# Patient Record
Sex: MALE | Race: WHITE | Employment: FULL TIME | URBAN - METROPOLITAN AREA
[De-identification: names, ages, dates, MRNs, and addresses within clinical notes are randomized per-mention and may not be internally consistent; named-entity substitution may affect disease eponyms.]

---

## 2017-11-10 ENCOUNTER — TELEPHONE (OUTPATIENT)
Dept: FAMILY MEDICINE CLINIC | Age: 57
End: 2017-11-10

## 2017-11-30 ENCOUNTER — HOSPITAL ENCOUNTER (OUTPATIENT)
Age: 57
Discharge: HOME | End: 2017-11-30
Payer: COMMERCIAL

## 2017-11-30 ENCOUNTER — HOSPITAL ENCOUNTER (OUTPATIENT)
Dept: GENERAL RADIOLOGY | Age: 57
Discharge: HOME | End: 2017-11-30
Payer: COMMERCIAL

## 2017-11-30 DIAGNOSIS — S83.261A PERIPHERAL TEAR OF LATERAL MENISCUS OF RIGHT KNEE, UNSPECIFIED WHETHER OLD OR CURRENT TEAR, INITIAL ENCOUNTER: ICD-10-CM

## 2017-11-30 DIAGNOSIS — S83.261A ACUTE LATERAL MENISCAL TEAR WITH PERIPHERAL DETACHMENT, RIGHT, INITIAL ENCOUNTER: ICD-10-CM

## 2017-11-30 LAB
ANION GAP SERPL CALCULATED.3IONS-SCNC: 10 MEQ/L (ref 8–16)
BASOPHILS # BLD: 0.7 %
BASOPHILS ABSOLUTE: 0.1 THOU/MM3 (ref 0–0.1)
BUN BLDV-MCNC: 16 MG/DL (ref 7–22)
CALCIUM SERPL-MCNC: 9.1 MG/DL (ref 8.5–10.5)
CHLORIDE BLD-SCNC: 101 MEQ/L (ref 98–111)
CO2: 27 MEQ/L (ref 23–33)
CREAT SERPL-MCNC: 1.1 MG/DL (ref 0.4–1.2)
EKG ATRIAL RATE: 67 BPM
EKG P AXIS: 81 DEGREES
EKG P-R INTERVAL: 134 MS
EKG Q-T INTERVAL: 400 MS
EKG QRS DURATION: 80 MS
EKG QTC CALCULATION (BAZETT): 422 MS
EKG R AXIS: 79 DEGREES
EKG T AXIS: 70 DEGREES
EKG VENTRICULAR RATE: 67 BPM
EOSINOPHIL # BLD: 3.5 %
EOSINOPHILS ABSOLUTE: 0.3 THOU/MM3 (ref 0–0.4)
GFR SERPL CREATININE-BSD FRML MDRD: 68 ML/MIN/1.73M2
GLUCOSE BLD-MCNC: 98 MG/DL (ref 70–108)
HCT VFR BLD CALC: 41.1 % (ref 42–52)
HEMOGLOBIN: 14 GM/DL (ref 14–18)
LYMPHOCYTES # BLD: 21.2 %
LYMPHOCYTES ABSOLUTE: 1.7 THOU/MM3 (ref 1–4.8)
MCH RBC QN AUTO: 31.9 PG (ref 27–31)
MCHC RBC AUTO-ENTMCNC: 34 GM/DL (ref 33–37)
MCV RBC AUTO: 93.6 FL (ref 80–94)
MONOCYTES # BLD: 8 %
MONOCYTES ABSOLUTE: 0.6 THOU/MM3 (ref 0.4–1.3)
NUCLEATED RED BLOOD CELLS: 0 /100 WBC
PDW BLD-RTO: 13.1 % (ref 11.5–14.5)
PLATELET # BLD: 284 THOU/MM3 (ref 130–400)
PMV BLD AUTO: 7.2 MCM (ref 7.4–10.4)
POTASSIUM SERPL-SCNC: 4.8 MEQ/L (ref 3.5–5.2)
RBC # BLD: 4.39 MILL/MM3 (ref 4.7–6.1)
SEG NEUTROPHILS: 66.6 %
SEGMENTED NEUTROPHILS ABSOLUTE COUNT: 5.3 THOU/MM3 (ref 1.8–7.7)
SODIUM BLD-SCNC: 138 MEQ/L (ref 135–145)
WBC # BLD: 8 THOU/MM3 (ref 4.8–10.8)

## 2017-11-30 PROCEDURE — 93005 ELECTROCARDIOGRAM TRACING: CPT

## 2017-11-30 PROCEDURE — 80048 BASIC METABOLIC PNL TOTAL CA: CPT

## 2017-11-30 PROCEDURE — 36415 COLL VENOUS BLD VENIPUNCTURE: CPT

## 2017-11-30 PROCEDURE — 85025 COMPLETE CBC W/AUTO DIFF WBC: CPT

## 2017-11-30 PROCEDURE — 71020 XR CHEST STANDARD TWO VW: CPT

## 2017-12-04 ENCOUNTER — OFFICE VISIT (OUTPATIENT)
Dept: FAMILY MEDICINE CLINIC | Age: 57
End: 2017-12-04

## 2017-12-04 VITALS
HEART RATE: 76 BPM | DIASTOLIC BLOOD PRESSURE: 64 MMHG | RESPIRATION RATE: 14 BRPM | WEIGHT: 134.13 LBS | HEIGHT: 70 IN | BODY MASS INDEX: 19.2 KG/M2 | SYSTOLIC BLOOD PRESSURE: 114 MMHG

## 2017-12-04 DIAGNOSIS — S83.281D TEAR OF LATERAL MENISCUS OF RIGHT KNEE, CURRENT, UNSPECIFIED TEAR TYPE, SUBSEQUENT ENCOUNTER: ICD-10-CM

## 2017-12-04 DIAGNOSIS — S83.241D TEAR OF MEDIAL MENISCUS OF RIGHT KNEE, CURRENT, UNSPECIFIED TEAR TYPE, SUBSEQUENT ENCOUNTER: ICD-10-CM

## 2017-12-04 DIAGNOSIS — Z13.220 SCREENING FOR HYPERLIPIDEMIA: ICD-10-CM

## 2017-12-04 DIAGNOSIS — J44.9 CHRONIC OBSTRUCTIVE PULMONARY DISEASE, UNSPECIFIED COPD TYPE (HCC): Primary | ICD-10-CM

## 2017-12-04 PROCEDURE — 99214 OFFICE O/P EST MOD 30 MIN: CPT | Performed by: FAMILY MEDICINE

## 2017-12-04 RX ORDER — ALBUTEROL SULFATE 90 UG/1
2 AEROSOL, METERED RESPIRATORY (INHALATION) EVERY 6 HOURS PRN
Qty: 1 INHALER | Refills: 2 | Status: SHIPPED | OUTPATIENT
Start: 2017-12-04 | End: 2019-01-18 | Stop reason: SDUPTHER

## 2017-12-04 ASSESSMENT — ENCOUNTER SYMPTOMS
NAUSEA: 0
VOMITING: 0
COUGH: 0
ABDOMINAL PAIN: 0
DIARRHEA: 0
EYES NEGATIVE: 1
CHEST TIGHTNESS: 0
BLOOD IN STOOL: 0
CONSTIPATION: 0
SHORTNESS OF BREATH: 0

## 2017-12-04 ASSESSMENT — PATIENT HEALTH QUESTIONNAIRE - PHQ9
1. LITTLE INTEREST OR PLEASURE IN DOING THINGS: 0
SUM OF ALL RESPONSES TO PHQ9 QUESTIONS 1 & 2: 0
2. FEELING DOWN, DEPRESSED OR HOPELESS: 0
SUM OF ALL RESPONSES TO PHQ QUESTIONS 1-9: 0

## 2017-12-04 NOTE — PROGRESS NOTES
Constitutional: He is oriented to person, place, and time. He appears well-developed and well-nourished. No distress. HENT:   Head: Normocephalic and atraumatic. Eyes: Conjunctivae and EOM are normal. Pupils are equal, round, and reactive to light. Right eye exhibits no discharge. Left eye exhibits no discharge. No scleral icterus. Neck: Normal range of motion. Neck supple. No JVD present. No thyromegaly present. Cardiovascular: Normal rate, regular rhythm and normal heart sounds. No murmur heard. Pulmonary/Chest: Effort normal and breath sounds normal. No respiratory distress. He has no wheezes. He has no rhonchi. He has no rales. Abdominal: Soft. Bowel sounds are normal. He exhibits no distension and no mass. There is no hepatosplenomegaly. There is no tenderness. There is no rebound and no guarding. Musculoskeletal: Normal range of motion. He exhibits no edema. Lymphadenopathy:     He has no cervical adenopathy. Neurological: He is alert and oriented to person, place, and time. Skin: Skin is warm and dry. No rash noted. He is not diaphoretic. Psychiatric: He has a normal mood and affect. His behavior is normal.   Nursing note and vitals reviewed. Assessment:      1. Chronic obstructive pulmonary disease, unspecified COPD type ([address]     2. Tear of lateral meniscus of right knee, current, unspecified tear type, subsequent encounter     3. Tear of medial meniscus of right knee, current, unspecified tear type, subsequent encounter     4.  Screening for hyperlipidemia  Lipid Panel       Plan:      Requested Prescriptions     Signed Prescriptions Disp Refills    albuterol sulfate  (90 Base) MCG/ACT inhaler 1 Inhaler 2     Sig: Inhale 2 puffs into the lungs every 6 hours as needed for Wheezing     Current Outpatient Prescriptions   Medication Sig Dispense Refill    albuterol sulfate  (90 Base) MCG/ACT inhaler Inhale 2 puffs into the lungs every 6 hours as needed for Wheezing 1 Inhaler 2    aspirin 325 MG tablet Take 325 mg by mouth every 6 hours as needed        No current facility-administered medications for this visit. Orders Placed This Encounter   Procedures    Lipid Panel     Laboratory Test to be done in 3 months     Standing Status:   Future     Standing Expiration Date:   12/4/2018     Order Specific Question:   Is Patient Fasting?/# of Hours     Answer:   12     Continue current medications. Discussed use, benefit, and side effects of prescribed medications. All patient questions answered. Pt voiced understanding. Instructed to continue current medications, diet and exercise. Patient agreed with treatment plan.      Results for orders placed or performed during the hospital encounter of 11/30/17   CBC Auto Differential   Result Value Ref Range    WBC 8.0 4.8 - 10.8 thou/mm3    RBC 4.39 (L) 4.70 - 6.10 mill/mm3    Hemoglobin 14.0 14.0 - 18.0 gm/dl    Hematocrit 41.1 (L) 42.0 - 52.0 %    MCV 93.6 80.0 - 94.0 fL    MCH 31.9 (H) 27.0 - 31.0 pg    MCHC 34.0 33.0 - 37.0 gm/dl    RDW 13.1 11.5 - 14.5 %    Platelets 731 394 - 037 thou/mm3    MPV 7.2 (L) 7.4 - 10.4 mcm    Seg Neutrophils 66.6 %    Lymphocytes 21.2 %    Monocytes 8.0 %    Eosinophils 3.5 %    Basophils 0.7 %    nRBC 0 /100 wbc    Segs Absolute 5.3 1.8 - 7.7 thou/mm3    Lymphocytes # 1.7 1.0 - 4.8 thou/mm3    Monocytes # 0.6 0.4 - 1.3 thou/mm3    Eosinophils # 0.3 0.0 - 0.4 thou/mm3    Basophils # 0.1 0.0 - 0.1 thou/mm3   Basic Metabolic Panel   Result Value Ref Range    Sodium 138 135 - 145 meq/L    Potassium 4.8 3.5 - 5.2 meq/L    Chloride 101 98 - 111 meq/L    CO2 27 23 - 33 meq/L    Glucose 98 70 - 108 mg/dL    BUN 16 7 - 22 mg/dL    CREATININE 1.1 0.4 - 1.2 mg/dL    Calcium 9.1 8.5 - 10.5 mg/dL   Glomerular Filtration Rate, Estimated   Result Value Ref Range    Est, Glom Filt Rate 68 (A) ml/min/1.73m2   Anion Gap   Result Value Ref Range    Anion Gap 10.0 8.0 - 16.0 meq/L   EKG 12 Lead   Result Value Ref

## 2017-12-05 RX ORDER — ACETAMINOPHEN 500 MG
500 TABLET ORAL EVERY 6 HOURS PRN
COMMUNITY

## 2017-12-08 ENCOUNTER — TELEPHONE (OUTPATIENT)
Dept: FAMILY MEDICINE CLINIC | Age: 57
End: 2017-12-08

## 2017-12-08 NOTE — TELEPHONE ENCOUNTER
Cassi Butcher from Dr Winnie Franco office called req we fax the Pre Op [address] [facility] [address] form to [facility] ASAP.

## 2017-12-08 NOTE — H&P
[address] [place] [address]                         PREOPERATIVE HISTORY AND PHYSICAL    PATIENT NAME: Ira Diego                    :        1960  MED REC NO:   [id number]                           ROOM:  ACCOUNT NO:   [de-identified]                           ADMIT DATE: 2017  PROVIDER:     Sherwood Rinne, P.a.c[facility] DATE OF SURGERY:  2017. CHIEF COMPLAINT:  Right knee pain. HISTORY OF PRESENT ILLNESS:  The patient was injured at work when he was  bit by a dog and had chronic pain, was seen by us, had an MRI that shows  the lateral meniscus tear as well as a medial meniscus tear. Due to the  findings on the MRI as well as the patient's activities of daily living  being limited by the right knee injury, Dr. Antonio Alexander has recommended  the right knee arthroscopy for diagnostic treatment, debridement and  partial lateral meniscectomy as well as possible partial medial  meniscectomy. The patient has been educated and the risks and benefits of  the procedure including infection, stiffness of knee, retearing of the  remaining meniscus as well as development of arthritis earlier in life in  the right knee. Patient has been educated on pre and postoperative  protocols understanding the risks, benefits as well as pre and  postoperative protocols. The patient has agreed to forward with the  above-stated procedure on 2017. PAST MEDICAL HISTORY:  COPD and skin cancer. PAST SURGICAL HISTORY:  Extraction of wisdom teeth and skin cancer excision  on the left side of his face. FAMILY HISTORY:  Otherwise noncontributory. SOCIAL HISTORY:  The patient works for [employer], has a long smoking history of  one to two packs per day for up to 30 to 40 years and now smokes three  cigarettes per day for the last 1 to 2 years. Denies use of alcohol or  street drugs.     CURRENT MEDICATIONS:  Albuterol inhaler as needed. ALLERGIES:  No known drug allergies. REVIEW OF SYSTEMS:  Otherwise noncontributory. PHYSICAL EXAMINATION:  GENERAL:  A well-developed, well-nourished 47-year-old white male who is  alert and oriented x3, appropriate mood and affect, well dressed and  groomed. HEENT:  Normocephalic, atraumatic. EOMs intact. No pharyngitis. No  obvious infectious teeth. LUNGS:  Clear to auscultation bilaterally. CARDIOVASCULAR:  Regular rate and rhythm. No murmurs, rubs or gallops. ABDOMEN:  Soft, nontender, positive bowel sounds. MUSCULOSKELETAL:  Exam of the right lower extremity today shows range of  motion 0 to 130 degrees. He has a well-healed puncture wound from the dog  bite on the medial aspect of the knee, proximal tibia area. A mild amount  of swelling. Negative valgus varus and Lachman's test.  Positive Albrecht's  test.  Tenderness to palpation over the medial and lateral joint lines. MRI shows lateral meniscus tear as well as medial meniscus tear. IMPRESSION:  Right knee medial and lateral meniscus tears. PLAN:  Right knee arthroscopy for diagnostic treatment, debridement and  partial lateral meniscectomy and possible medial meniscectomy. ISHA Schmidt     D: 12/08/2017 9:02:19       T: 12/08/2017 9:05:26     [email or web address]  Job#: [id number]     Doc#: [id number]    CC:

## 2017-12-11 ENCOUNTER — ANESTHESIA EVENT (OUTPATIENT)
Dept: OPERATING ROOM | Age: 57
End: 2017-12-11
Payer: COMMERCIAL

## 2017-12-11 ENCOUNTER — ANESTHESIA (OUTPATIENT)
Dept: OPERATING ROOM | Age: 57
End: 2017-12-11
Payer: COMMERCIAL

## 2017-12-11 ENCOUNTER — HOSPITAL ENCOUNTER (OUTPATIENT)
Age: 57
Setting detail: OUTPATIENT SURGERY
Discharge: HOME | End: 2017-12-11
Attending: ORTHOPAEDIC SURGERY | Admitting: ORTHOPAEDIC SURGERY
Payer: COMMERCIAL

## 2017-12-11 VITALS
TEMPERATURE: 98 F | DIASTOLIC BLOOD PRESSURE: 65 MMHG | HEIGHT: 70 IN | WEIGHT: 135 LBS | HEART RATE: 66 BPM | SYSTOLIC BLOOD PRESSURE: 111 MMHG | BODY MASS INDEX: 19.33 KG/M2 | OXYGEN SATURATION: 98 % | RESPIRATION RATE: 16 BRPM

## 2017-12-11 VITALS — SYSTOLIC BLOOD PRESSURE: 98 MMHG | OXYGEN SATURATION: 86 % | TEMPERATURE: 96.8 F | DIASTOLIC BLOOD PRESSURE: 66 MMHG

## 2017-12-11 PROBLEM — M23.303 DERANGEMENT OF MEDIAL MENISCUS OF RIGHT KNEE: Status: ACTIVE | Noted: 2017-12-11

## 2017-12-11 PROBLEM — M23.300 DERANGEMENT OF LATERAL MENISCUS OF RIGHT KNEE: Status: ACTIVE | Noted: 2017-12-11

## 2017-12-11 PROCEDURE — A6222 GAUZE <=16 IN NO W/SAL W/O B: HCPCS | Performed by: ORTHOPAEDIC SURGERY

## 2017-12-11 PROCEDURE — 2580000003 HC RX 258: Performed by: ORTHOPAEDIC SURGERY

## 2017-12-11 PROCEDURE — 7100000001 HC PACU RECOVERY - ADDTL 15 MIN: Performed by: ORTHOPAEDIC SURGERY

## 2017-12-11 PROCEDURE — A6258 TRANSPARENT FILM >16<=48 IN: HCPCS | Performed by: ORTHOPAEDIC SURGERY

## 2017-12-11 PROCEDURE — 7100000000 HC PACU RECOVERY - FIRST 15 MIN: Performed by: ORTHOPAEDIC SURGERY

## 2017-12-11 PROCEDURE — 3600000004 HC SURGERY LEVEL 4 BASE: Performed by: ORTHOPAEDIC SURGERY

## 2017-12-11 PROCEDURE — A6402 STERILE GAUZE <= 16 SQ IN: HCPCS | Performed by: ORTHOPAEDIC SURGERY

## 2017-12-11 PROCEDURE — 2500000003 HC RX 250 WO HCPCS: Performed by: NURSE ANESTHETIST, CERTIFIED REGISTERED

## 2017-12-11 PROCEDURE — 6360000002 HC RX W HCPCS: Performed by: REGISTERED NURSE

## 2017-12-11 PROCEDURE — 3700000001 HC ADD 15 MINUTES (ANESTHESIA): Performed by: ORTHOPAEDIC SURGERY

## 2017-12-11 PROCEDURE — 6360000002 HC RX W HCPCS: Performed by: ORTHOPAEDIC SURGERY

## 2017-12-11 PROCEDURE — 3600000014 HC SURGERY LEVEL 4 ADDTL 15MIN: Performed by: ORTHOPAEDIC SURGERY

## 2017-12-11 PROCEDURE — 6360000002 HC RX W HCPCS: Performed by: NURSE ANESTHETIST, CERTIFIED REGISTERED

## 2017-12-11 PROCEDURE — 7100000010 HC PHASE II RECOVERY - FIRST 15 MIN: Performed by: ORTHOPAEDIC SURGERY

## 2017-12-11 PROCEDURE — 7100000011 HC PHASE II RECOVERY - ADDTL 15 MIN: Performed by: ORTHOPAEDIC SURGERY

## 2017-12-11 PROCEDURE — 2500000003 HC RX 250 WO HCPCS: Performed by: ORTHOPAEDIC SURGERY

## 2017-12-11 PROCEDURE — A4450 NON-WATERPROOF TAPE: HCPCS | Performed by: ORTHOPAEDIC SURGERY

## 2017-12-11 PROCEDURE — A6454 SELF-ADHER BAND W>=3" <5"/YD: HCPCS | Performed by: ORTHOPAEDIC SURGERY

## 2017-12-11 PROCEDURE — A6252 ABSORPT DRG >16 <=48 W/O BDR: HCPCS | Performed by: ORTHOPAEDIC SURGERY

## 2017-12-11 PROCEDURE — 3700000000 HC ANESTHESIA ATTENDED CARE: Performed by: ORTHOPAEDIC SURGERY

## 2017-12-11 RX ORDER — METHYLPREDNISOLONE ACETATE 80 MG/ML
INJECTION, SUSPENSION INTRA-ARTICULAR; INTRALESIONAL; INTRAMUSCULAR; SOFT TISSUE PRN
Status: DISCONTINUED | OUTPATIENT
Start: 2017-12-11 | End: 2017-12-11 | Stop reason: HOSPADM

## 2017-12-11 RX ORDER — DEXAMETHASONE SODIUM PHOSPHATE 4 MG/ML
INJECTION, SOLUTION INTRA-ARTICULAR; INTRALESIONAL; INTRAMUSCULAR; INTRAVENOUS; SOFT TISSUE PRN
Status: DISCONTINUED | OUTPATIENT
Start: 2017-12-11 | End: 2017-12-11 | Stop reason: SDUPTHER

## 2017-12-11 RX ORDER — ONDANSETRON 2 MG/ML
INJECTION INTRAMUSCULAR; INTRAVENOUS PRN
Status: DISCONTINUED | OUTPATIENT
Start: 2017-12-11 | End: 2017-12-11 | Stop reason: SDUPTHER

## 2017-12-11 RX ORDER — LABETALOL HYDROCHLORIDE 5 MG/ML
10 INJECTION, SOLUTION INTRAVENOUS EVERY 10 MIN PRN
Status: DISCONTINUED | OUTPATIENT
Start: 2017-12-11 | End: 2017-12-11 | Stop reason: HOSPADM

## 2017-12-11 RX ORDER — ACETAMINOPHEN 325 MG/1
650 TABLET ORAL EVERY 4 HOURS PRN
Status: DISCONTINUED | OUTPATIENT
Start: 2017-12-11 | End: 2017-12-11 | Stop reason: HOSPADM

## 2017-12-11 RX ORDER — KETOROLAC TROMETHAMINE 30 MG/ML
INJECTION, SOLUTION INTRAMUSCULAR; INTRAVENOUS PRN
Status: DISCONTINUED | OUTPATIENT
Start: 2017-12-11 | End: 2017-12-11 | Stop reason: SDUPTHER

## 2017-12-11 RX ORDER — ONDANSETRON 2 MG/ML
4 INJECTION INTRAMUSCULAR; INTRAVENOUS EVERY 6 HOURS PRN
Status: DISCONTINUED | OUTPATIENT
Start: 2017-12-11 | End: 2017-12-11 | Stop reason: HOSPADM

## 2017-12-11 RX ORDER — BUPIVACAINE HYDROCHLORIDE 5 MG/ML
INJECTION, SOLUTION EPIDURAL; INTRACAUDAL PRN
Status: DISCONTINUED | OUTPATIENT
Start: 2017-12-11 | End: 2017-12-11 | Stop reason: HOSPADM

## 2017-12-11 RX ORDER — LIDOCAINE HYDROCHLORIDE 20 MG/ML
INJECTION, SOLUTION INFILTRATION; PERINEURAL PRN
Status: DISCONTINUED | OUTPATIENT
Start: 2017-12-11 | End: 2017-12-11 | Stop reason: SDUPTHER

## 2017-12-11 RX ORDER — TRAMADOL HYDROCHLORIDE 50 MG/1
50 TABLET ORAL EVERY 6 HOURS PRN
Status: DISCONTINUED | OUTPATIENT
Start: 2017-12-11 | End: 2017-12-11 | Stop reason: HOSPADM

## 2017-12-11 RX ORDER — GLYCOPYRROLATE 0.2 MG/ML
INJECTION INTRAMUSCULAR; INTRAVENOUS PRN
Status: DISCONTINUED | OUTPATIENT
Start: 2017-12-11 | End: 2017-12-11 | Stop reason: SDUPTHER

## 2017-12-11 RX ORDER — SODIUM CHLORIDE 9 MG/ML
INJECTION, SOLUTION INTRAVENOUS CONTINUOUS
Status: DISCONTINUED | OUTPATIENT
Start: 2017-12-11 | End: 2017-12-11 | Stop reason: HOSPADM

## 2017-12-11 RX ORDER — DOCUSATE SODIUM 100 MG/1
100 CAPSULE, LIQUID FILLED ORAL 2 TIMES DAILY
Status: DISCONTINUED | OUTPATIENT
Start: 2017-12-11 | End: 2017-12-11 | Stop reason: HOSPADM

## 2017-12-11 RX ORDER — FENTANYL CITRATE 50 UG/ML
INJECTION, SOLUTION INTRAMUSCULAR; INTRAVENOUS PRN
Status: DISCONTINUED | OUTPATIENT
Start: 2017-12-11 | End: 2017-12-11 | Stop reason: SDUPTHER

## 2017-12-11 RX ORDER — PROMETHAZINE HYDROCHLORIDE 25 MG/ML
12.5 INJECTION, SOLUTION INTRAMUSCULAR; INTRAVENOUS
Status: DISCONTINUED | OUTPATIENT
Start: 2017-12-11 | End: 2017-12-11 | Stop reason: HOSPADM

## 2017-12-11 RX ORDER — PROPOFOL 10 MG/ML
INJECTION, EMULSION INTRAVENOUS PRN
Status: DISCONTINUED | OUTPATIENT
Start: 2017-12-11 | End: 2017-12-11 | Stop reason: SDUPTHER

## 2017-12-11 RX ADMIN — PROPOFOL 200 MG: 10 INJECTION, EMULSION INTRAVENOUS at 15:44

## 2017-12-11 RX ADMIN — WATER 1 G: 1 INJECTION INTRAMUSCULAR; INTRAVENOUS; SUBCUTANEOUS at 15:48

## 2017-12-11 RX ADMIN — FENTANYL CITRATE 50 MCG: 50 INJECTION INTRAMUSCULAR; INTRAVENOUS at 16:00

## 2017-12-11 RX ADMIN — ONDANSETRON 4 MG: 2 INJECTION INTRAMUSCULAR; INTRAVENOUS at 16:02

## 2017-12-11 RX ADMIN — LIDOCAINE HYDROCHLORIDE 100 MG: 20 INJECTION, SOLUTION EPIDURAL; INFILTRATION; INTRACAUDAL; PERINEURAL at 15:44

## 2017-12-11 RX ADMIN — SODIUM CHLORIDE: 9 INJECTION, SOLUTION INTRAVENOUS at 13:20

## 2017-12-11 RX ADMIN — KETOROLAC TROMETHAMINE 30 MG: 30 INJECTION, SOLUTION INTRAMUSCULAR at 16:09

## 2017-12-11 RX ADMIN — PHENYLEPHRINE HYDROCHLORIDE 100 MCG: 10 INJECTION INTRAMUSCULAR; INTRAVENOUS; SUBCUTANEOUS at 15:50

## 2017-12-11 RX ADMIN — PHENYLEPHRINE HYDROCHLORIDE 100 MCG: 10 INJECTION INTRAMUSCULAR; INTRAVENOUS; SUBCUTANEOUS at 16:10

## 2017-12-11 RX ADMIN — FENTANYL CITRATE 50 MCG: 50 INJECTION INTRAMUSCULAR; INTRAVENOUS at 16:09

## 2017-12-11 RX ADMIN — DEXAMETHASONE SODIUM PHOSPHATE 8 MG: 4 INJECTION, SOLUTION INTRAMUSCULAR; INTRAVENOUS at 16:02

## 2017-12-11 RX ADMIN — FENTANYL CITRATE 50 MCG: 50 INJECTION INTRAMUSCULAR; INTRAVENOUS at 15:44

## 2017-12-11 RX ADMIN — GLYCOPYRROLATE 0.2 MG: 0.2 INJECTION, SOLUTION INTRAMUSCULAR; INTRAVENOUS at 15:48

## 2017-12-11 ASSESSMENT — PULMONARY FUNCTION TESTS
PIF_VALUE: 4
PIF_VALUE: 12
PIF_VALUE: 3
PIF_VALUE: 13
PIF_VALUE: 4
PIF_VALUE: 3
PIF_VALUE: 3
PIF_VALUE: 0
PIF_VALUE: 13
PIF_VALUE: 16
PIF_VALUE: 4
PIF_VALUE: 13
PIF_VALUE: 14
PIF_VALUE: 12
PIF_VALUE: 15
PIF_VALUE: 13
PIF_VALUE: 4
PIF_VALUE: 4
PIF_VALUE: 15
PIF_VALUE: 16
PIF_VALUE: 23
PIF_VALUE: 3
PIF_VALUE: 3
PIF_VALUE: 15
PIF_VALUE: 13
PIF_VALUE: 1
PIF_VALUE: 16
PIF_VALUE: 15
PIF_VALUE: 15
PIF_VALUE: 2
PIF_VALUE: 3
PIF_VALUE: 16
PIF_VALUE: 13
PIF_VALUE: 13
PIF_VALUE: 15
PIF_VALUE: 12
PIF_VALUE: 12
PIF_VALUE: 13
PIF_VALUE: 16
PIF_VALUE: 2
PIF_VALUE: 3
PIF_VALUE: 15
PIF_VALUE: 11
PIF_VALUE: 3
PIF_VALUE: 5
PIF_VALUE: 4
PIF_VALUE: 15
PIF_VALUE: 4
PIF_VALUE: 1
PIF_VALUE: 15
PIF_VALUE: 4
PIF_VALUE: 3
PIF_VALUE: 13

## 2017-12-11 ASSESSMENT — PAIN SCALES - GENERAL
PAINLEVEL_OUTOF10: 2
PAINLEVEL_OUTOF10: 0

## 2017-12-11 ASSESSMENT — PAIN - FUNCTIONAL ASSESSMENT: PAIN_FUNCTIONAL_ASSESSMENT: 0-10

## 2017-12-11 ASSESSMENT — LIFESTYLE VARIABLES: SMOKING_STATUS: 1

## 2017-12-11 NOTE — PRE-PROCEDURE INSTRUCTIONS
[facility]  History and Physical Update    Pt Name: Vianey Morrison  MRN: [id number]  YOB: 1960  Date of evaluation: 12/11/2017    I have examined the patient and reviewed the H&P/Consult and there are no changes to the patient or plans.       Misbah Duarte  Electronically signed 12/11/2017 at 2:57 PM

## 2017-12-11 NOTE — PROGRESS NOTES
Pt resting on cart. States pain tolerable. Given snack and drink. FAmily at bedside.
Pt resting on cart. VSS. Pain tolerable wishes to go home.
Joselin [address]   ----------------------------------------------------------------------------------------------------------------

## 2017-12-11 NOTE — BRIEF OP NOTE
BRIEF OP NOTE    Pre Op Diagnosis:   r knee med/lat meniscal tear  Chondromalacia m/pf  Ligamentum mucosal tear    Post Op Diagnosis:  same    Operation:  r knee scope m/l menisectomy   Chondroplasty m/pf  Inject marcaine/depomedrol    Surgeon/Assistant:  teressa cutler/naun pac    Anesthesia/Assistant:  rosmery cutler/dedrick stockton    Type of Anesthesia:  General/tourniquet 27 mins    Fluids:  800 ml    Blood Loss:  0    Findings:  As above    Complications:  0    Surgical Specimen:  0      Emmett Modi MD  4:31 PM  12/11/2017

## 2017-12-11 NOTE — ANESTHESIA PRE PROCEDURE
Department of Anesthesiology  Preprocedure Note       Name:  Seb Stone   Age:  59 y.o.  :  1960                                          MRN:  [id number]         Date:  2017      Surgeon: Leotis Paget):  Ramiro Josue MD    Procedure: Procedure(s):  RIGHT KNEE ARTHROSCOPY PARTIAL LATERAL MEDIAL MENISECTOMY    Medications prior to admission:   Prior to Admission medications    Medication Sig Start Date End Date Taking? Authorizing Provider   acetaminophen (TYLENOL) 500 MG tablet Take 500 mg by mouth every 6 hours as needed for Pain   Yes Historical Provider, MD   albuterol sulfate  (90 Base) MCG/ACT inhaler Inhale 2 puffs into the lungs every 6 hours as needed for Wheezing 17  Yes Marilou Coy MD       Current medications:    Current Facility-Administered Medications   Medication Dose Route Frequency Provider Last Rate Last Dose    0.9 % sodium chloride infusion   Intravenous Continuous Ramiro Josue  mL/hr at 17 1320      ceFAZolin (ANCEF) 2 g in sterile water 20 mL IV syringe  2 g Intravenous Once Ramiro Josue MD           Allergies:  No Known Allergies    Problem List:  There is no problem list on file for this patient.       Past Medical History:        Diagnosis Date    Cancer [facility]) 2017    SKIN CANCER, LEFT FACE NEAR EAR    COPD (chronic obstructive [facility] disease) [facility]        Past Surgical History:        Procedure Laterality Date    VARICOCELECTOMY         Social History:    Social History   Substance Use Topics    Smoking status: Current Every Day Smoker     Packs/day: 0.25     Years: 40.00     Types: Cigarettes     Start date: 1985    Smokeless tobacco: Never Used    Alcohol use Yes      Comment: rarely                                Ready to quit: Not Answered  Counseling given: Not Answered      Vital Signs (Current):   Vitals:    17 1405 17 1312   BP:  114/66   Pulse:  76   Resp:  17   Temp:  98.2 °F (36.8 °C) TempSrc:  Temporal   SpO2:  95%   Weight: 135 lb (61.2 kg)    Height: 5' 10\" (1.778 m)                                               BP Readings from Last 3 Encounters:   12/11/17 114/66   12/04/17 114/64   06/05/17 132/80       NPO Status: Time of last liquid consumption: 2100                        Time of last solid consumption: 2100                        Date of last liquid consumption: 12/10/17                        Date of last solid food consumption: 12/10/17    BMI:   Wt Readings from Last 3 Encounters:   12/05/17 135 lb (61.2 kg)   12/04/17 134 lb 2 oz (60.8 kg)   06/05/17 130 lb (59 kg)     Body mass index is 19.37 kg/m². CBC:   Lab Results   Component Value Date    WBC 8.0 11/30/2017    RBC 4.39 11/30/2017    HGB 14.0 11/30/2017    HCT 41.1 11/30/2017    MCV 93.6 11/30/2017    RDW 13.1 11/30/2017     11/30/2017       CMP:   Lab Results   Component Value Date     11/30/2017    K 4.8 11/30/2017     11/30/2017    CO2 27 11/30/2017    BUN 16 11/30/2017    CREATININE 1.1 11/30/2017    LABGLOM 68 11/30/2017    GLUCOSE 98 11/30/2017    CALCIUM 9.1 11/30/2017       POC Tests: No results for input(s): POCGLU, POCNA, POCK, POCCL, POCBUN, POCHEMO, POCHCT in the last 72 hours. Coags: No results found for: PROTIME, INR, APTT    HCG (If Applicable): No results found for: PREGTESTUR, PREGSERUM, HCG, HCGQUANT     ABGs: No results found for: PHART, PO2ART, ELF1NLW, LSK5AIR, BEART, C6OVIKLS     Type & Screen (If Applicable):  No results found for: LABABO, [address]    Anesthesia Evaluation  Patient summary reviewed  Airway: Mallampati: II  TM distance: >3 FB   Neck ROM: full  Mouth opening: > = 3 FB Dental:          Pulmonary:   (+) COPD:  current smoker          Patient smoked on day of surgery.                  Cardiovascular:          ECG reviewed                        Neuro/Psych:               GI/Hepatic/Renal:             Endo/Other:                     Abdominal:           Vascular: Anesthesia Plan      general     ASA 2       Induction: intravenous. MIPS: Postoperative opioids intended and Prophylactic antiemetics administered. Anesthetic plan and risks discussed with patient, spouse and mother. Plan discussed with CRNA. Ammy Cage.  DO Pepe   12/11/2017

## 2017-12-12 NOTE — OP NOTE
[address] [id number]                                 OPERATIVE REPORT    PATIENT NAME: Janae Torres                    :        1960  MED REC NO:   [id number]                           ROOM:  ACCOUNT NO:   [de-identified]                           ADMIT DATE: 2017  PROVIDER:     Siddhartha Zavala M.D.    Luiz Hunter PROCEDURE:  2017    PREOPERATIVE DIAGNOSES:  Right knee torn medial and lateral meniscus with  some mild chondromalacia, patellofemoral grade 1 to 2 and grade 1 to 2 in  the medial compartment and ligamentum mucosal tear. POSTOPERATIVE DIAGNOSES:  Right knee torn medial and lateral meniscus with  some mild chondromalacia, patellofemoral grade 1 to 2 and grade 1 to 2 in  the medial compartment and ligamentum mucosal tear. OPERATION PERFORMED:  Right knee scope, posterior horn medial meniscus,  anterior horn lateral meniscus, cleaned up the ligamentum mucosum,  chondroplasty in the patellofemoral and medial compartment plus Marcaine  and Depo-Medrol. SURGEON:  HARRISON Smith:  ISHA Ronquillo ANESTHESIA:  Per Dr. Sydney So and Tobias rodriguez CRNA. DESCRIPTION OF PROCEDURE:  The patient was brought into the room, placed on  OR table, received IV general anesthesia per Dr. Sydney So and Tobias rodriguez CRNA. The patient then had a gram of Ancef and tourniquet applied, placed in  [address] knee joel, prepped and draped for exposure to the right knee. A  small stab incision was made in the anteromedial suprapatellar pouch. A  large Veress needle was introduced with a sharp followed by a blunt trocar. We then distended the joint, established portals in the medial and lateral  [place] triangle in the lateral side, taking the scope, sheath, light  source, camera, and outflow either side taking the instrumentation. Got  into the suprapatellar pouch, no problems.   A

## 2019-01-18 ENCOUNTER — OFFICE VISIT (OUTPATIENT)
Dept: FAMILY MEDICINE CLINIC | Age: 59
End: 2019-01-18

## 2019-01-18 VITALS
DIASTOLIC BLOOD PRESSURE: 62 MMHG | HEART RATE: 68 BPM | BODY MASS INDEX: 18.9 KG/M2 | RESPIRATION RATE: 15 BRPM | WEIGHT: 132 LBS | TEMPERATURE: 98.5 F | HEIGHT: 70 IN | SYSTOLIC BLOOD PRESSURE: 116 MMHG

## 2019-01-18 DIAGNOSIS — Z12.5 SCREENING FOR PROSTATE CANCER: ICD-10-CM

## 2019-01-18 DIAGNOSIS — J40 BRONCHITIS: Primary | ICD-10-CM

## 2019-01-18 DIAGNOSIS — Z13.1 SCREENING FOR DIABETES MELLITUS (DM): ICD-10-CM

## 2019-01-18 DIAGNOSIS — Z13.220 SCREENING FOR HYPERLIPIDEMIA: ICD-10-CM

## 2019-01-18 PROCEDURE — 99213 OFFICE O/P EST LOW 20 MIN: CPT | Performed by: FAMILY MEDICINE

## 2019-01-18 RX ORDER — ALBUTEROL SULFATE 90 UG/1
2 AEROSOL, METERED RESPIRATORY (INHALATION) EVERY 6 HOURS PRN
Qty: 1 INHALER | Refills: 3 | Status: SHIPPED | OUTPATIENT
Start: 2019-01-18

## 2019-01-18 RX ORDER — AZITHROMYCIN 250 MG/1
TABLET, FILM COATED ORAL
Qty: 1 PACKET | Refills: 0 | Status: SHIPPED | OUTPATIENT
Start: 2019-01-18 | End: 2019-01-28

## 2019-01-18 ASSESSMENT — ENCOUNTER SYMPTOMS
ABDOMINAL PAIN: 0
RHINORRHEA: 1
DIARRHEA: 0
COUGH: 1
SORE THROAT: 0
SINUS PRESSURE: 1
NAUSEA: 0
CHEST TIGHTNESS: 0
BLOOD IN STOOL: 0
SINUS PAIN: 1
EYES NEGATIVE: 1
WHEEZING: 0
VOMITING: 0
CONSTIPATION: 0
SHORTNESS OF BREATH: 0

## 2019-01-18 ASSESSMENT — PATIENT HEALTH QUESTIONNAIRE - PHQ9
SUM OF ALL RESPONSES TO PHQ9 QUESTIONS 1 & 2: 0
2. FEELING DOWN, DEPRESSED OR HOPELESS: 0
SUM OF ALL RESPONSES TO PHQ QUESTIONS 1-9: 0
SUM OF ALL RESPONSES TO PHQ QUESTIONS 1-9: 0
1. LITTLE INTEREST OR PLEASURE IN DOING THINGS: 0

## 2019-01-28 ENCOUNTER — TELEPHONE (OUTPATIENT)
Dept: FAMILY MEDICINE CLINIC | Age: 59
End: 2019-01-28

## 2019-03-29 ENCOUNTER — TELEPHONE (OUTPATIENT)
Dept: FAMILY MEDICINE CLINIC | Age: 59
End: 2019-03-29

## 2019-03-29 RX ORDER — AZITHROMYCIN 250 MG/1
TABLET, FILM COATED ORAL
Qty: 1 PACKET | Refills: 0 | Status: SHIPPED | OUTPATIENT
Start: 2019-03-29 | End: 2019-04-08

## 2021-01-20 ENCOUNTER — IMMUNIZATIONS (OUTPATIENT)
Dept: FAMILY MEDICINE CLINIC | Facility: HOSPITAL | Age: 61
End: 2021-01-20

## 2021-01-20 DIAGNOSIS — Z23 ENCOUNTER FOR IMMUNIZATION: Primary | ICD-10-CM

## 2021-01-20 PROCEDURE — 0001A SARS-COV-2 / COVID-19 MRNA VACCINE (PFIZER-BIONTECH) 30 MCG: CPT

## 2021-01-20 PROCEDURE — 91300 SARS-COV-2 / COVID-19 MRNA VACCINE (PFIZER-BIONTECH) 30 MCG: CPT

## 2021-02-09 ENCOUNTER — IMMUNIZATIONS (OUTPATIENT)
Dept: FAMILY MEDICINE CLINIC | Facility: HOSPITAL | Age: 61
End: 2021-02-09

## 2021-02-09 DIAGNOSIS — Z23 ENCOUNTER FOR IMMUNIZATION: Primary | ICD-10-CM

## 2021-02-09 PROCEDURE — 0002A SARS-COV-2 / COVID-19 MRNA VACCINE (PFIZER-BIONTECH) 30 MCG: CPT

## 2021-02-09 PROCEDURE — 91300 SARS-COV-2 / COVID-19 MRNA VACCINE (PFIZER-BIONTECH) 30 MCG: CPT

## (undated) DEVICE — INTENDED FOR TISSUE SEPARATION, AND OTHER PROCEDURES THAT REQUIRE A SHARP SURGICAL BLADE TO PUNCTURE OR CUT.: Brand: BARD-PARKER ® CARBON RIB-BACK BLADES

## (undated) DEVICE — HYPODERMIC SAFETY NEEDLE: Brand: MAGELLAN

## (undated) DEVICE — BANDAGE COMPR W6INXL5YD SELF ADH COHESIVE CO FLX

## (undated) DEVICE — [ARTHROSCOPY PUMP,  DO NOT USE IF PACKAGE IS DAMAGED,  KEEP DRY,  KEEP AWAY FROM SUNLIGHT,  PROTECT FROM HEAT AND RADIOACTIVE SOURCES.]: Brand: FLOSTEADY

## (undated) DEVICE — 3M™ TRANSPORE™ WHITE SURGICAL TAPE 1534-2, 2 INCH X 10 YARD (5CM X 9,1M), 6 ROLLS/CARTON 10 CARTONS/CASE: Brand: 3M™ TRANSPORE™

## (undated) DEVICE — KNEE ARTHROSCOPY V-LF: Brand: MEDLINE INDUSTRIES, INC.

## (undated) DEVICE — 3M™ WARMING BLANKET, UPPER BODY, 10 PER CASE, 42268: Brand: BAIR HUGGER™

## (undated) DEVICE — NEEDLE SPNL L3.5IN PNK HUB S STL REG WALL FIT STYL W/ QNCKE

## (undated) DEVICE — [SLOTTED WHISKER CUTTER, ARTHROSCOPIC SHAVER BLADE,  DO NOT RESTERILIZE,  DO NOT USE IF PACKAGE IS DAMAGED,  KEEP DRY,  KEEP AWAY FROM SUNLIGHT]: Brand: FORMULA

## (undated) DEVICE — STRIP,CLOSURE,WOUND,MEDI-STRIP,1/2X4: Brand: MEDLINE

## (undated) DEVICE — BLADE SAW RESECTOR CUT 4MM

## (undated) DEVICE — DRAPE,U/SHT,SPLIT,FILM,60X84,STERILE: Brand: MEDLINE

## (undated) DEVICE — PADDING CAST W6INXL4YD COT LO LINTING WYTEX

## (undated) DEVICE — CHLORAPREP 26ML ORANGE

## (undated) DEVICE — COVER ARMBRD W13XL28.5IN IMPERV BLU FOR OP RM

## (undated) DEVICE — IMPREGNATED GAUZE DRESSING: Brand: CUTICERIN 7.5X20CM CTN 50

## (undated) DEVICE — CATHETER ADAPTER: Brand: ADDTO

## (undated) DEVICE — PAD,ABDOMINAL,5"X9",ST,LF,25/BX: Brand: MEDLINE INDUSTRIES, INC.

## (undated) DEVICE — GOWN,SIRUS,NON REINFRCD,LARGE,SET IN SL: Brand: MEDLINE

## (undated) DEVICE — GAUZE,SPONGE,3"X3",12PLY,STERILE,LF,2'S: Brand: MEDLINE

## (undated) DEVICE — POSITIONER HD W8XH4XL8.5IN RASPBERRY FOAM SLT

## (undated) DEVICE — BLADE CLIPPER GEN PURP NS

## (undated) DEVICE — PADDING,UNDERCAST,COTTON, 4"X4YD STERILE: Brand: MEDLINE

## (undated) DEVICE — 3M™ TEGADERM™ TRANSPARENT FILM DRESSING FRAME STYLE, 1626W, 4 IN X 4-3/4 IN (10 CM X 12 CM), 50/CT 4CT/CASE: Brand: 3M™ TEGADERM™

## (undated) DEVICE — GLOVE SURG SZ 65 THK91MIL LTX FREE SYN POLYISOPRENE

## (undated) DEVICE — ROYAL SILK SURGICAL GOWN, XXL: Brand: CONVERTORS